# Patient Record
Sex: MALE | Race: ASIAN | NOT HISPANIC OR LATINO | Employment: FULL TIME | URBAN - METROPOLITAN AREA
[De-identification: names, ages, dates, MRNs, and addresses within clinical notes are randomized per-mention and may not be internally consistent; named-entity substitution may affect disease eponyms.]

---

## 2019-10-14 ENCOUNTER — OCCUPATIONAL MEDICINE (OUTPATIENT)
Dept: URGENT CARE | Facility: CLINIC | Age: 24
End: 2019-10-14
Payer: COMMERCIAL

## 2019-10-14 VITALS
RESPIRATION RATE: 14 BRPM | TEMPERATURE: 97.6 F | HEIGHT: 64 IN | OXYGEN SATURATION: 96 % | BODY MASS INDEX: 35 KG/M2 | HEART RATE: 88 BPM | WEIGHT: 205 LBS | DIASTOLIC BLOOD PRESSURE: 80 MMHG | SYSTOLIC BLOOD PRESSURE: 120 MMHG

## 2019-10-14 DIAGNOSIS — Z77.098 CHEMICAL EXPOSURE: ICD-10-CM

## 2019-10-14 PROCEDURE — 99203 OFFICE O/P NEW LOW 30 MIN: CPT | Mod: 29 | Performed by: PHYSICIAN ASSISTANT

## 2019-10-14 ASSESSMENT — ENCOUNTER SYMPTOMS
COUGH: 0
EYE REDNESS: 0
FEVER: 0
HEADACHES: 0
SHORTNESS OF BREATH: 0
EYE DISCHARGE: 0
VOMITING: 0
SORE THROAT: 0
NAUSEA: 0

## 2019-10-14 NOTE — LETTER
[facility]  [address] [facility] [address], [address] [place] [address]  Phone:  [phone] - Fax:  [phone]   [facility] Progress Report and Disability Certification  Date of Service: 10/14/2019   No Show:  No  Date / Time of Next Visit: 10/16/2019   Claim Information   Patient Name: Marli Almendarez  Claim Number:     Employer: [employer]  Date of Injury: 10/14/2019     Insurer / TPA: [employer]  ID / SSN:     Occupation:   Diagnosis: The encounter diagnosis was Chemical exposure.    Medical Information   Related to Industrial Injury? Yes    Subjective Complaints:  The patient presents to clinic for initial Workmen's Comp. evaluation.    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 The patient states the slurry is comprised of Roberto, PVDF, Lithium Carbonate, FX paste, and slurry. The patient reports no exposure to hydrofluoric acid. The patient states he was wearing an overall shirt in addition to the glove. The patient states the slurry came in contact with the the sleeve of his overall, which then touched the skin of the forearm. The developed redness and burning pain to the contact area immediately after the exposure. The patient also reports associated itchiness to the area. The patient washed his right forearm for approx. 15 minutes after the incident. The patient states the burning has improved. He is still experiencing redness and slight increased warmth to the area. He reports no swelling to the area. No skin lesions. No discharge/drainage. No joint pain. No fever. He reports no history of same. The patient does not work a second job.      Objective Findings: Right Forearm:  Slight erythema to the medial aspect of the right forearm with slight increased warmth. No  tenderness to palpation. No swelling. No skin lesions. No skin breakdown. No discharge/drainage.  ROM intact - the patient demonstrates full active range of motion of his right elbow, wrist, and hand.  Neurovascular intact   strength 5/5      Pre-Existing Condition(s):     Assessment:   Initial Visit    Status: Additional Care Required  Permanent Disability:No    Plan:      Diagnostics:      Comments:  Plan:  Full Duty without Restrictions - D39 and C4 provided  Monitor symptoms   Follow-up in 2 days for re-assessment   Return to clinic sooner if symptoms worsen, or if the patient should develop worsening/increasing/persistent redness to his right   forearm, increased warmth, pain/tenderness, swelling, skin breakdown, skin lesions, discharge/drainage from the area, fever/chills, secondary signs of infection, and/or any concerning symptoms.      Disability Information   Status: Released to Full Duty    From:  10/14/2019  Through: 10/16/2019 Restrictions are: Temporary   Physical Restrictions   Sitting:    Standing:    Stooping:    Bending:      Squatting:    Walking:    Climbing:    Pushing:      Pulling:    Other:    Reaching Above Shoulder (L):   Reaching Above Shoulder (R):       Reaching Below Shoulder (L):    Reaching Below Shoulder (R):      Not to exceed Weight Limits   Carrying(hrs):   Weight Limit(lb):   Lifting(hrs):   Weight  Limit(lb):     Comments: Wear gloves and overall sleeve at all times while at work.     Repetitive Actions   Hands: i.e. Fine Manipulations from Grasping:     Feet: i.e. Operating Foot Controls:     Driving / Operate Machinery:     Physician Name: Mesfin Meza P.A.-C. Physician Signature: MESFIN Mejia P.A.-C. e-Signature: Dr. Rafita Koehler, Medical Director   Clinic Name / Location: [address], [address]  [place] [address] Clinic Phone Number: Dept: [phone]   Appointment Time: 1:15 Pm Visit Start Time: 2:02 PM   Check-In Time:   1:11 Pm Visit Discharge Time: 3:25 Pm    Original-Treating Physician or Chiropractor    Page 2-Insurer/TPA    Page 3-Employer    Page 4-Employee

## 2019-10-14 NOTE — PROGRESS NOTES
Subjective:      Marli Almendarez is a 24 y.o. male who presents with Rash (WC New, chemical exposure, rash/irritation R/ arm.)        HPI    The patient presents to clinic for initial Workmen's Comp. evaluation.    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 The patient states the slurry is comprised of Roberto, PVDF, Lithium Carbonate, FX paste, and slurry. The patient reports no exposure to hydrofluoric acid. The patient states he was wearing an overall shirt in addition to the glove. The patient states the slurry came in contact with the the sleeve of his overall, which then touched the skin of the forearm. The developed redness and burning pain to the contact area immediately after the exposure. The patient also reports associated itchiness to the area. The patient washed his right forearm for approx. 15 minutes after the incident. The patient states the burning has improved. He is still experiencing redness and slight increased warmth to the area. He reports no swelling to the area. No skin lesions. No discharge/drainage. No joint pain. No fever. He reports no history of same. The patient does not work a second job.     PMH: Reviewed in Epic, no pertinent findings.  MEDS: Reviewed in Epic.  ALLERGIES: Reviewed in Epic.  SURGHX: Reviewed in Epic.  SOCHX: Reviewed in Epic.  FH: Family history was reviewed, no pertinent findings to report.    Review of Systems   Constitutional: Negative for fever.   HENT: Negative for congestion, ear pain and sore throat.    Eyes: Negative for discharge and redness.   Respiratory: Negative for cough and shortness of breath.    Cardiovascular: Negative for chest pain and leg swelling.   Gastrointestinal: Negative for nausea and vomiting.   Musculoskeletal: Negative for joint pain.   Skin: Positive for rash.   Neurological: Negative for  "headaches.   All other systems reviewed and are negative.         Objective:     /80   Pulse 88   Temp 36.4 °C (97.6 °F) (Temporal)   Resp 14   Ht 1.626 m (5' 4\")   Wt 93 kg (205 lb)   SpO2 96%   BMI 35.19 kg/m²      Physical Exam   Constitutional: He is oriented to person, place, and time. He appears well-developed and well-nourished. No distress.   HENT:   Head: Normocephalic and atraumatic.   Right Ear: External ear normal.   Left Ear: External ear normal.   Nose: Nose normal.   Eyes: Conjunctivae and EOM are normal.   Neck: Normal range of motion. Neck supple.   Cardiovascular: Normal rate.   Pulmonary/Chest: Effort normal.   Musculoskeletal: Normal range of motion.        Arms:  Right Forearm:  Slight erythema to the medial aspect of the right forearm with slight increased warmth. No tenderness to palpation. No swelling. No skin lesions. No skin breakdown. No discharge/drainage.  ROM intact - the patient demonstrates full active range of motion of his right elbow, wrist, and hand.  Neurovascular intact   strength 5/5   Neurological: He is alert and oriented to person, place, and time.   Skin: Skin is warm and dry.          Progress:  Spoke with Dr. Good (occupational health) regarding the patient's exposure. Dr. Good recommended monitoring the patient's symptoms with close follow-up.      Assessment/Plan:     1. Chemical exposure    Differential diagnoses, supportive care, and indications for immediate follow-up discussed with patient.   Instructed to return to clinic or nearest emergency department for any change in condition, further concerns, or worsening of symptoms.    Plan:  Full Duty without Restrictions - D39 and C4 provided  Monitor symptoms   Follow-up in 2 days for re-assessment   Return to clinic sooner if symptoms worsen, or if the patient should develop worsening/increasing/persistent redness to his right forearm, increased warmth, pain/tenderness, swelling, skin breakdown, " skin lesions, discharge/drainage from the area, fever/chills, secondary signs of infection, and/or any concerning symptoms.    Discussed plan with the patient, and he agrees to the above.

## 2019-10-14 NOTE — LETTER
"EMPLOYEE’S CLAIM FOR COMPENSATION/ REPORT OF INITIAL TREATMENT  FORM C-4    EMPLOYEE’S CLAIM - PROVIDE ALL INFORMATION REQUESTED   First Name  Marli Last Name  Erickson Birthdate                    1995                Sex  male Claim Number   Home Address  [address] Age  24 y.o. Height  1.626 m (5' 4\") Weight  93 kg (205 lb) [facility]     [place] Zip  [address] Telephone  [phone] (home)    Mailing Address  [address] [place]  [address] Primary Language Spoken  English    Insurer   Third Party   [employer]   Employee's Occupation (Job Title) When Injury or Occupational Disease Occurred      Employer's Name  [employer]  Telephone  [phone]    Employer Address  [address]  [place]  Zip  [address]    Date of Injury  10/14/2019               Hour of Injury  11:30 AM Date Employer Notified  10/14/2019 Last Day of Work after Injury or Occupational Disease  10/14/2019 Supervisor to Whom Injury Reported  maryam Hernadez[facility] lead   Address or Location of Accident (if applicable)  [[employer] ([employer]]   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 (Be specific an answer in detail. Use additional sheet if necessary)  Cleaning the clurry over flow sub-tank when I tried to pulled the gloves right comes off    If you believe that you have an occupational disease, when did you first have knowledge of the disability and it relationship to your employment?  n/a Witnesses to the Accident  none       Nature of Injury or Occupational Disease  Inflammation  Part(s) of Body Injured or Affected  Lower Arm (L), Lower Arm (R),     I certify that the above is true and correct to the best of my knowledge and " that I have provided this information in order to obtain the benefits of [place]  I hereby authorize any physician, chiropractor, surgeon, practitioner, or other person, any hospital, including [facility] or [facility], any medical service organization, any insurance company, or other institution or organization to release to each other, any medical or other information, including benefits paid or payable, pertinent to this injury or disease, except information relative to diagnosis, treatment and/or counseling for AIDS, psychological conditions, alcohol or controlled substances, for which I must give specific authorization.  A Photostat of this authorization shall be as valid as the original.     Date   Place   Employee’s Signature   THIS REPORT MUST BE COMPLETED AND MAILED WITHIN 3 WORKING DAYS OF TREATMENT   Place  [facility]  Name of Facility  [facility]   Date  10/14/2019 Diagnosis  (Z77.098) Chemical exposure Is there evidence the injured employee was under the influence of alcohol and/or another controlled substance at the time of accident?   Hour  2:02 PM Description of Injury or Disease  The encounter diagnosis was Chemical exposure. No   Treatment  Plan:  Full Duty without Restrictions - D39 and C4 provided  Monitor symptoms   Follow-up in 2 days for re-assessment   Return to clinic sooner if symptoms worsen, or if the patient should develop worsening/increasing/persistent redness to his right forearm, increased warmth, pain/tenderness, swelling, skin breakdown, skin lesions, discharge/drainage from the area, fever/chills, secondary signs of infection, and/or any concerning symptoms.    Have you advised the patient to remain off work five days or more? No   X-Ray Findings      If Yes   From Date  To Date      From information given by the employee, together with  "medical evidence, can you directly connect this injury or occupational disease as job incurred?  Yes If No Full Duty  Yes Modified Duty      Is additional medical care by a physician indicated?  Yes If Modified Duty, Specify any Limitations / Restrictions      Do you know of any previous injury or disease contributing to this condition or occupational disease?                            No   Date  10/14/2019 Print Doctor’s Name Mesfin Meza P.A.-C. I certify the employer’s copy of  this form was mailed on:   Address  [address]     [place] Zip  [address]    Provider’s Tax ID Number  [id number] Telephone  Dept: [phone]        e-SignMESFIN MEZA P.A.-C.   e-Signature: Dr. Rafita Koehler, Medical Director Degree  MD        ORIGINAL-TREATING PHYSICIAN OR CHIROPRACTOR    PAGE 2-INSURER/TPA    PAGE 3-EMPLOYER    PAGE 4-EMPLOYEE             Form C-4 (rev.10/07)              BRIEF DESCRIPTION OF RIGHTS AND BENEFITS  (Pursuant to [place]RS 616C.050)    Notice of Injury or Occupational Disease (Incident Report Form C-1): If an injury or occupational disease (OD) arises out of and in the course of employment, you must provide written notice to your employer as soon as practicable, but no later than 7 days after the accident or OD. Your employer shall maintain a sufficient supply of the required forms.    Claim for Compensation (Form C-4): If medical treatment is sought, the form C-4 is available at the place of initial treatment. A completed \"Claim for Compensation\" (Form C-4) must be filed within 90 days after an accident or OD. The treating physician or chiropractor must, within 3 working days after treatment, complete and mail to the employer, the employer's insurer and third-party , the Claim for Compensation.    Medical Treatment: If you require medical treatment for your on-the-job injury or OD, you may be required to select a physician or chiropractor from " a list provided by your workers’ compensation insurer, if it has contracted with an Organization for Managed Care (MCO) or Preferred Provider Organization (PPO) or providers of health care. If your employer has not entered into a contract with an MCO or PPO, you may select a physician or chiropractor from the Panel of Physicians and Chiropractors. Any medical costs related to your industrial injury or OD will be paid by your insurer.    Temporary Total Disability (TTD): If your doctor has certified that you are unable to work for a period of at least 5 consecutive days, or 5 cumulative days in a 20-day period, or places restrictions on you that your employer does not accommodate, you may be entitled to TTD compensation.    Temporary Partial Disability (TPD): If the wage you receive upon reemployment is less than the compensation for TTD to which you are entitled, the insurer may be required to pay you TPD compensation to make up the difference. TPD can only be paid for a maximum of 24 months.    Permanent Partial Disability (PPD): When your medical condition is stable and there is an indication of a PPD as a result of your injury or OD, within 30 days, your insurer must arrange for an evaluation by a rating physician or chiropractor to determine the degree of your PPD. The amount of your PPD award depends on the date of injury, the results of the PPD evaluation and your age and wage.    Permanent Total Disability (PTD): If you are medically certified by a treating physician or chiropractor as permanently and totally disabled and have been granted a PTD status by your insurer, you are entitled to receive monthly benefits not to exceed 66 2/3% of your average monthly wage. The amount of your PTD payments is subject to reduction if you previously received a PPD award.    Vocational Rehabilitation Services: You may be eligible for vocational rehabilitation services if you are unable to return to the job due to a  permanent physical impairment or permanent restrictions as a result of your injury or occupational disease.    Transportation and Per Lyndsey Reimbursement: You may be eligible for travel expenses and per lyndsey associated with medical treatment.    Reopening: You may be able to reopen your claim if your condition worsens after claim closure.    Appeal Process: If you disagree with a written determination issued by the insurer or the insurer does not respond to your request, you may appeal to the Department of Administration, , by following the instructions contained in your determination letter. You must appeal the determination within 70 days from the date of the determination letter at [address], [address], [place], [place] [address], or [address], [address], [place] [address]. If you disagree with the  decision, you may appeal to the Department of Administration, . You must file your appeal within 30 days from the date of the  decision letter at [address], [address], [place], [place] [address], or [address] [facility] [address], [place] [address]. If you disagree with a decision of an , you may file a petition for judicial review with the District Court. You must do so within 30 days of the Appeal Officer’s decision. You may be represented by an  at your own expense or you may contact the [facility] for possible representation.    [place]  for Injured Workers (NAIW): If you disagree with a  decision, you may request that NAIW represent you without charge at an  Hearing. For information regarding denial of benefits, you may contact the [facility] at: [address], [address], [place] [address], (240) [phone], or [address], [place] [address], [phone]    To File a Complaint with the Division: If you wish to file a  complaint with the  of the Division of Industrial Relations (DIR),  please contact the Workers’ Compensation Section, [address], [address], [place], [place] [address], telephone (070) 197-5118, or [address], [address], [place], [place] [address], telephone [phone].    For assistance with Workers’ Compensation Issues: You may contact the Office of the Governor Consumer Health Assistance, [address] [place] [address], [address], [place], [place] [address], Toll Free 1-469.475.2960, Web site: http://HeyLets.[facility].nv., E-mail [email or web address].                             __________________________________________________________________                                                                   _________________                Employee Name / Signature                                                                                                                                                       Date                                                                                                                                                                                                     D-2 (rev. 06/18)